# Patient Record
Sex: FEMALE | Race: WHITE | NOT HISPANIC OR LATINO | Employment: UNEMPLOYED | URBAN - METROPOLITAN AREA
[De-identification: names, ages, dates, MRNs, and addresses within clinical notes are randomized per-mention and may not be internally consistent; named-entity substitution may affect disease eponyms.]

---

## 2023-06-01 PROBLEM — K62.5 RECTAL BLEEDING: Status: ACTIVE | Noted: 2023-06-01

## 2023-06-01 PROBLEM — D22.9 CHANGE IN COLOR OF SKIN MOLE: Status: ACTIVE | Noted: 2023-06-01

## 2023-06-01 PROBLEM — L30.9 ECZEMA: Status: ACTIVE | Noted: 2023-06-01

## 2023-06-01 PROBLEM — K64.8 BLEEDING INTERNAL HEMORRHOIDS: Status: ACTIVE | Noted: 2023-06-01

## 2023-06-01 PROBLEM — K64.4 EXTERNAL HEMORRHOIDS: Status: ACTIVE | Noted: 2023-06-01

## 2023-06-01 PROBLEM — M79.675 PAIN OF TOE OF LEFT FOOT: Status: ACTIVE | Noted: 2023-06-01

## 2023-06-01 PROBLEM — K63.5 COLON POLYP: Status: ACTIVE | Noted: 2023-06-01

## 2023-06-01 PROBLEM — J30.2 SEASONAL ALLERGIES: Status: ACTIVE | Noted: 2023-06-01

## 2023-06-01 PROBLEM — L25.9 CONTACT DERMATITIS AND ECZEMA: Status: ACTIVE | Noted: 2023-06-01

## 2023-06-01 PROBLEM — R21 SKIN RASH: Status: ACTIVE | Noted: 2023-06-01

## 2023-06-01 PROBLEM — R23.3 EASY BRUISING: Status: ACTIVE | Noted: 2023-06-01

## 2023-06-01 PROBLEM — K58.0 IRRITABLE BOWEL SYNDROME WITH DIARRHEA: Status: ACTIVE | Noted: 2023-06-01

## 2023-06-01 RX ORDER — GARLIC 200 MG
TABLET ORAL
COMMUNITY
End: 2023-06-08 | Stop reason: ALTCHOICE

## 2023-06-01 RX ORDER — SODIUM, POTASSIUM,MAG SULFATES 17.5-3.13G
SOLUTION, RECONSTITUTED, ORAL ORAL
COMMUNITY
Start: 2021-03-26 | End: 2023-06-08 | Stop reason: ALTCHOICE

## 2023-06-01 RX ORDER — ASPIRIN 325 MG
TABLET, DELAYED RELEASE (ENTERIC COATED) ORAL
COMMUNITY

## 2023-06-01 RX ORDER — HYDROCORTISONE ACETATE 25 MG/1
SUPPOSITORY RECTAL 2 TIMES DAILY
COMMUNITY
Start: 2021-03-23 | End: 2023-06-08 | Stop reason: ALTCHOICE

## 2023-06-01 RX ORDER — HYDROCORTISONE 25 MG/G
CREAM TOPICAL
COMMUNITY
Start: 2020-07-23 | End: 2023-06-08 | Stop reason: ALTCHOICE

## 2023-06-01 RX ORDER — POLYETHYLENE GLYCOL 3350, SODIUM CHLORIDE, SODIUM BICARBONATE, POTASSIUM CHLORIDE 420; 11.2; 5.72; 1.48 G/4L; G/4L; G/4L; G/4L
POWDER, FOR SOLUTION ORAL
COMMUNITY
Start: 2022-09-26 | End: 2023-06-08 | Stop reason: ALTCHOICE

## 2023-06-01 RX ORDER — MOMETASONE FUROATE 1 MG/G
OINTMENT TOPICAL 2 TIMES DAILY
COMMUNITY
Start: 2021-01-28 | End: 2023-06-08 | Stop reason: ALTCHOICE

## 2023-06-08 ENCOUNTER — OFFICE VISIT (OUTPATIENT)
Dept: PRIMARY CARE | Facility: CLINIC | Age: 28
End: 2023-06-08
Payer: COMMERCIAL

## 2023-06-08 ENCOUNTER — APPOINTMENT (OUTPATIENT)
Dept: PRIMARY CARE | Facility: CLINIC | Age: 28
End: 2023-06-08
Payer: COMMERCIAL

## 2023-06-08 VITALS
BODY MASS INDEX: 25.16 KG/M2 | DIASTOLIC BLOOD PRESSURE: 70 MMHG | WEIGHT: 166 LBS | SYSTOLIC BLOOD PRESSURE: 110 MMHG | HEIGHT: 68 IN | HEART RATE: 78 BPM | RESPIRATION RATE: 20 BRPM

## 2023-06-08 DIAGNOSIS — R23.3 EASY BRUISING: ICD-10-CM

## 2023-06-08 DIAGNOSIS — D12.5 ADENOMATOUS POLYP OF SIGMOID COLON: ICD-10-CM

## 2023-06-08 DIAGNOSIS — Z13.29 SCREENING FOR THYROID DISORDER: ICD-10-CM

## 2023-06-08 DIAGNOSIS — R42 EPISODIC LIGHTHEADEDNESS: ICD-10-CM

## 2023-06-08 DIAGNOSIS — Z00.00 ROUTINE GENERAL MEDICAL EXAMINATION AT A HEALTH CARE FACILITY: Primary | ICD-10-CM

## 2023-06-08 DIAGNOSIS — L20.84 INTRINSIC ECZEMA: ICD-10-CM

## 2023-06-08 DIAGNOSIS — K64.4 EXTERNAL HEMORRHOIDS: ICD-10-CM

## 2023-06-08 PROBLEM — K62.5 RECTAL BLEEDING: Status: RESOLVED | Noted: 2023-06-01 | Resolved: 2023-06-08

## 2023-06-08 PROBLEM — M79.675 PAIN OF TOE OF LEFT FOOT: Status: RESOLVED | Noted: 2023-06-01 | Resolved: 2023-06-08

## 2023-06-08 PROCEDURE — 99395 PREV VISIT EST AGE 18-39: CPT | Performed by: INTERNAL MEDICINE

## 2023-06-08 PROCEDURE — 84443 ASSAY THYROID STIM HORMONE: CPT

## 2023-06-08 PROCEDURE — 85027 COMPLETE CBC AUTOMATED: CPT

## 2023-06-08 PROCEDURE — 82728 ASSAY OF FERRITIN: CPT

## 2023-06-08 PROCEDURE — 80053 COMPREHEN METABOLIC PANEL: CPT

## 2023-06-08 PROCEDURE — 83550 IRON BINDING TEST: CPT

## 2023-06-08 PROCEDURE — 85610 PROTHROMBIN TIME: CPT

## 2023-06-08 PROCEDURE — 83540 ASSAY OF IRON: CPT

## 2023-06-08 PROCEDURE — 1036F TOBACCO NON-USER: CPT | Performed by: INTERNAL MEDICINE

## 2023-06-08 RX ORDER — FERROUS SULFATE 325(65) MG
65 TABLET, DELAYED RELEASE (ENTERIC COATED) ORAL
COMMUNITY

## 2023-06-08 RX ORDER — ASCORBIC ACID 1000 MG
TABLET, EXTENDED RELEASE ORAL
COMMUNITY

## 2023-06-08 ASSESSMENT — ENCOUNTER SYMPTOMS
BRUISES/BLEEDS EASILY: 0
FLANK PAIN: 0
UNEXPECTED WEIGHT CHANGE: 0
PHOTOPHOBIA: 0
TROUBLE SWALLOWING: 0
POLYDIPSIA: 0
HEMATURIA: 0
DIAPHORESIS: 0
TREMORS: 0
DYSURIA: 0
APPETITE CHANGE: 0
NECK PAIN: 0
FATIGUE: 0
RHINORRHEA: 0
HEADACHES: 1
ARTHRALGIAS: 0
WOUND: 0
SHORTNESS OF BREATH: 0
NECK STIFFNESS: 0
SINUS PAIN: 0
SPEECH DIFFICULTY: 0
CHOKING: 0
DIZZINESS: 0
DIFFICULTY URINATING: 0
LIGHT-HEADEDNESS: 1
DYSPHORIC MOOD: 0
APNEA: 0
ABDOMINAL PAIN: 0
WEAKNESS: 0
FACIAL ASYMMETRY: 0
ACTIVITY CHANGE: 0
STRIDOR: 0
NUMBNESS: 0
CONSTIPATION: 0
EYE ITCHING: 0
DECREASED CONCENTRATION: 0
JOINT SWELLING: 0
FACIAL SWELLING: 0
NERVOUS/ANXIOUS: 0
CHEST TIGHTNESS: 0
CHILLS: 0
HALLUCINATIONS: 0
AGITATION: 0
WHEEZING: 0
CONFUSION: 0
VOMITING: 0
EYE PAIN: 0
ADENOPATHY: 0
SLEEP DISTURBANCE: 1
COUGH: 0
NAUSEA: 0
RECTAL PAIN: 0
POLYPHAGIA: 0
BLOOD IN STOOL: 0
EYE DISCHARGE: 0
BACK PAIN: 0
ANAL BLEEDING: 0
ABDOMINAL DISTENTION: 0
HYPERACTIVE: 0
SORE THROAT: 0
COLOR CHANGE: 0
SEIZURES: 0
VOICE CHANGE: 0
FEVER: 0
SINUS PRESSURE: 0
DIARRHEA: 0
PALPITATIONS: 0
FREQUENCY: 0
MYALGIAS: 0
EYE REDNESS: 0

## 2023-06-08 NOTE — ASSESSMENT & PLAN NOTE
This is likely from low blood pressure and changes that occur when she changes positions   Recommend salt in diet/drinks to maintain BP  Compression socks if able to tolerate  Slow changes in position

## 2023-06-08 NOTE — PATIENT INSTRUCTIONS
We did labs today and will call with results  See GYN annually for exam (midwife)   Your BP may be dropping when you get up/change positions; can take in a bit more salt-via sports drinks or food  Can consider wearing compression socks (knee highs) to help prevent blood pooling to legs  Continue regular exercise  Follow up yearly for physical

## 2023-06-08 NOTE — PROGRESS NOTES
Subjective   Patient ID: Kendal Feliz is a 27 y.o. female who presents for Annual Exam.    HPI  Pt here for full physical.  She last was seen in 2020.  She is working on weight loss-she lifts weights for exercise.      She continues to have colonoscopies frequently.  She was noted to have polyps.  Her polyps grew quickly in size so they want her to have them every 2 years roughly.  Last one was 12/2022.      Pt has 9 month old and feels mood is a bit down.  Her sleep is not great.  She is breastfeeding and doesn't want to be on meds for this at this time.      She has a rash on her hands.  She has had this since 2020.  She saw Derm recently and was given a steroid cream.      Review of Systems   Constitutional:  Negative for activity change, appetite change, chills, diaphoresis, fatigue, fever and unexpected weight change.   HENT:  Negative for congestion, dental problem, drooling, ear discharge, ear pain, facial swelling, hearing loss, mouth sores, nosebleeds, postnasal drip, rhinorrhea, sinus pressure, sinus pain, sneezing, sore throat, tinnitus, trouble swallowing and voice change.    Eyes:  Positive for visual disturbance (wears glasses-no recent exam). Negative for photophobia, pain, discharge, redness and itching.   Respiratory:  Negative for apnea, cough, choking, chest tightness, shortness of breath, wheezing and stridor.    Cardiovascular:  Negative for chest pain, palpitations and leg swelling.   Gastrointestinal:  Negative for abdominal distention, abdominal pain, anal bleeding, blood in stool, constipation, diarrhea, nausea, rectal pain and vomiting.   Endocrine: Negative for cold intolerance, heat intolerance, polydipsia, polyphagia and polyuria.   Genitourinary:  Negative for decreased urine volume, difficulty urinating, dyspareunia, dysuria, enuresis, flank pain, frequency, genital sores, hematuria, menstrual problem, pelvic pain, urgency, vaginal bleeding, vaginal discharge and vaginal  "pain.   Musculoskeletal:  Negative for arthralgias, back pain, gait problem, joint swelling, myalgias, neck pain and neck stiffness.   Skin:  Positive for rash (hands). Negative for color change, pallor and wound.   Allergic/Immunologic: Negative for environmental allergies, food allergies and immunocompromised state.   Neurological:  Positive for light-headedness and headaches. Negative for dizziness, tremors, seizures, syncope, facial asymmetry, speech difficulty, weakness and numbness.   Hematological:  Negative for adenopathy. Does not bruise/bleed easily.   Psychiatric/Behavioral:  Positive for sleep disturbance (due to having infant). Negative for agitation, behavioral problems, confusion, decreased concentration, dysphoric mood, hallucinations, self-injury and suicidal ideas. The patient is not nervous/anxious and is not hyperactive.        Objective   /70   Pulse 78   Resp 20   Ht 1.734 m (5' 8.25\")   Wt 75.3 kg (166 lb)   BMI 25.06 kg/m²    Physical Exam  Constitutional:       Appearance: Normal appearance.   HENT:      Head: Normocephalic and atraumatic.      Right Ear: Tympanic membrane, ear canal and external ear normal. There is no impacted cerumen.      Left Ear: Tympanic membrane, ear canal and external ear normal. There is no impacted cerumen.      Nose: Nose normal. No congestion or rhinorrhea.      Mouth/Throat:      Mouth: Mucous membranes are moist.      Pharynx: Oropharynx is clear. No oropharyngeal exudate or posterior oropharyngeal erythema.   Eyes:      Extraocular Movements: Extraocular movements intact.      Conjunctiva/sclera: Conjunctivae normal.      Pupils: Pupils are equal, round, and reactive to light.   Neck:      Vascular: No carotid bruit.   Cardiovascular:      Rate and Rhythm: Normal rate and regular rhythm.      Pulses: Normal pulses.      Heart sounds: Normal heart sounds. No murmur heard.  Pulmonary:      Effort: Pulmonary effort is normal. No respiratory distress. "      Breath sounds: Normal breath sounds. No wheezing, rhonchi or rales.   Abdominal:      General: Abdomen is flat. Bowel sounds are normal. There is no distension.      Palpations: Abdomen is soft.      Tenderness: There is no abdominal tenderness.      Hernia: No hernia is present.   Musculoskeletal:         General: No swelling or tenderness. Normal range of motion.      Cervical back: Normal range of motion and neck supple.      Right lower leg: No edema.      Left lower leg: No edema.   Lymphadenopathy:      Cervical: No cervical adenopathy.   Skin:     General: Skin is warm and dry.      Findings: No lesion or rash.   Neurological:      General: No focal deficit present.      Mental Status: She is alert and oriented to person, place, and time.      Cranial Nerves: No cranial nerve deficit.      Sensory: No sensory deficit.      Motor: No weakness.   Psychiatric:         Mood and Affect: Mood normal.         Behavior: Behavior normal.         Thought Content: Thought content normal.         Judgment: Judgment normal.         Assessment/Plan   Problem List Items Addressed This Visit          Circulatory    External hemorrhoids       Digestive    Colon polyp     She is having colonoscopies every 2 years due to size and growth of her polyps             Infectious/Inflammatory    Eczema     Had a new steroid cream from derm             Other    Easy bruising    Relevant Orders    Iron and TIBC    Ferritin    Protime-INR    Episodic lightheadedness     This is likely from low blood pressure and changes that occur when she changes positions   Recommend salt in diet/drinks to maintain BP  Compression socks if able to tolerate  Slow changes in position           Other Visit Diagnoses       Routine general medical examination at a health care facility    -  Primary    Relevant Orders    Comprehensive Metabolic Panel    CBC    Follow Up In Primary Care    Screening for thyroid disorder        Relevant Orders    TSH

## 2023-06-09 LAB
ALANINE AMINOTRANSFERASE (SGPT) (U/L) IN SER/PLAS: 13 U/L (ref 7–45)
ALBUMIN (G/DL) IN SER/PLAS: 4.9 G/DL (ref 3.4–5)
ALKALINE PHOSPHATASE (U/L) IN SER/PLAS: 100 U/L (ref 33–110)
ANION GAP IN SER/PLAS: 16 MMOL/L (ref 10–20)
ASPARTATE AMINOTRANSFERASE (SGOT) (U/L) IN SER/PLAS: 21 U/L (ref 9–39)
BILIRUBIN TOTAL (MG/DL) IN SER/PLAS: 0.4 MG/DL (ref 0–1.2)
CALCIUM (MG/DL) IN SER/PLAS: 10 MG/DL (ref 8.6–10.6)
CARBON DIOXIDE, TOTAL (MMOL/L) IN SER/PLAS: 25 MMOL/L (ref 21–32)
CHLORIDE (MMOL/L) IN SER/PLAS: 104 MMOL/L (ref 98–107)
CREATININE (MG/DL) IN SER/PLAS: 0.81 MG/DL (ref 0.5–1.05)
ERYTHROCYTE DISTRIBUTION WIDTH (RATIO) BY AUTOMATED COUNT: 12.1 % (ref 11.5–14.5)
ERYTHROCYTE MEAN CORPUSCULAR HEMOGLOBIN CONCENTRATION (G/DL) BY AUTOMATED: 32.3 G/DL (ref 32–36)
ERYTHROCYTE MEAN CORPUSCULAR VOLUME (FL) BY AUTOMATED COUNT: 98 FL (ref 80–100)
ERYTHROCYTES (10*6/UL) IN BLOOD BY AUTOMATED COUNT: 4.51 X10E12/L (ref 4–5.2)
FERRITIN (UG/LL) IN SER/PLAS: 64 UG/L (ref 8–150)
GFR FEMALE: >90 ML/MIN/1.73M2
GLUCOSE (MG/DL) IN SER/PLAS: 89 MG/DL (ref 74–99)
HEMATOCRIT (%) IN BLOOD BY AUTOMATED COUNT: 44.3 % (ref 36–46)
HEMOGLOBIN (G/DL) IN BLOOD: 14.3 G/DL (ref 12–16)
INR IN PPP BY COAGULATION ASSAY: 0.9 (ref 0.9–1.1)
IRON (UG/DL) IN SER/PLAS: 142 UG/DL (ref 35–150)
IRON BINDING CAPACITY (UG/DL) IN SER/PLAS: 332 UG/DL (ref 240–445)
IRON SATURATION (%) IN SER/PLAS: 43 % (ref 25–45)
LEUKOCYTES (10*3/UL) IN BLOOD BY AUTOMATED COUNT: 5.5 X10E9/L (ref 4.4–11.3)
NRBC (PER 100 WBCS) BY AUTOMATED COUNT: 0 /100 WBC (ref 0–0)
PLATELETS (10*3/UL) IN BLOOD AUTOMATED COUNT: 229 X10E9/L (ref 150–450)
POTASSIUM (MMOL/L) IN SER/PLAS: 4 MMOL/L (ref 3.5–5.3)
PROTEIN TOTAL: 7.3 G/DL (ref 6.4–8.2)
PROTHROMBIN TIME (PT) IN PPP BY COAGULATION ASSAY: 10.6 SEC (ref 9.8–13.4)
SODIUM (MMOL/L) IN SER/PLAS: 141 MMOL/L (ref 136–145)
THYROTROPIN (MIU/L) IN SER/PLAS BY DETECTION LIMIT <= 0.05 MIU/L: 1.66 MIU/L (ref 0.44–3.98)
UREA NITROGEN (MG/DL) IN SER/PLAS: 19 MG/DL (ref 6–23)

## 2024-06-18 ENCOUNTER — APPOINTMENT (OUTPATIENT)
Dept: PRIMARY CARE | Facility: CLINIC | Age: 29
End: 2024-06-18
Payer: COMMERCIAL

## 2024-06-18 VITALS
WEIGHT: 162.2 LBS | HEIGHT: 69 IN | SYSTOLIC BLOOD PRESSURE: 118 MMHG | DIASTOLIC BLOOD PRESSURE: 80 MMHG | BODY MASS INDEX: 24.02 KG/M2 | HEART RATE: 80 BPM | RESPIRATION RATE: 16 BRPM

## 2024-06-18 DIAGNOSIS — Z13.29 SCREENING FOR THYROID DISORDER: ICD-10-CM

## 2024-06-18 DIAGNOSIS — J30.2 SEASONAL ALLERGIES: ICD-10-CM

## 2024-06-18 DIAGNOSIS — R23.3 EASY BRUISING: ICD-10-CM

## 2024-06-18 DIAGNOSIS — Z00.00 ROUTINE GENERAL MEDICAL EXAMINATION AT A HEALTH CARE FACILITY: Primary | ICD-10-CM

## 2024-06-18 PROCEDURE — 85027 COMPLETE CBC AUTOMATED: CPT

## 2024-06-18 PROCEDURE — 1036F TOBACCO NON-USER: CPT | Performed by: INTERNAL MEDICINE

## 2024-06-18 PROCEDURE — 84443 ASSAY THYROID STIM HORMONE: CPT

## 2024-06-18 PROCEDURE — 80053 COMPREHEN METABOLIC PANEL: CPT

## 2024-06-18 PROCEDURE — 99395 PREV VISIT EST AGE 18-39: CPT | Performed by: INTERNAL MEDICINE

## 2024-06-18 PROCEDURE — 36415 COLL VENOUS BLD VENIPUNCTURE: CPT

## 2024-06-18 ASSESSMENT — ENCOUNTER SYMPTOMS
COUGH: 0
HEMATURIA: 0
APPETITE CHANGE: 0
VOICE CHANGE: 0
DIZZINESS: 0
RECTAL PAIN: 0
NUMBNESS: 0
VOMITING: 0
POLYDIPSIA: 0
TROUBLE SWALLOWING: 0
JOINT SWELLING: 0
MYALGIAS: 0
APNEA: 0
EYE PAIN: 0
EYE ITCHING: 0
STRIDOR: 0
EYE DISCHARGE: 0
HYPERACTIVE: 0
DIAPHORESIS: 0
FLANK PAIN: 0
LIGHT-HEADEDNESS: 0
BLOOD IN STOOL: 0
FATIGUE: 0
CONSTIPATION: 0
FREQUENCY: 0
ACTIVITY CHANGE: 0
CHILLS: 0
DIARRHEA: 0
COLOR CHANGE: 0
NECK STIFFNESS: 0
FACIAL ASYMMETRY: 0
WOUND: 0
EYE REDNESS: 0
WHEEZING: 0
ANAL BLEEDING: 0
NAUSEA: 0
ABDOMINAL DISTENTION: 0
ARTHRALGIAS: 0
ADENOPATHY: 0
PHOTOPHOBIA: 0
AGITATION: 0
SEIZURES: 0
HEADACHES: 0
FACIAL SWELLING: 0
SORE THROAT: 0
POLYPHAGIA: 0
DECREASED CONCENTRATION: 0
BACK PAIN: 0
ABDOMINAL PAIN: 0
SLEEP DISTURBANCE: 0
UNEXPECTED WEIGHT CHANGE: 0
SHORTNESS OF BREATH: 0
BRUISES/BLEEDS EASILY: 1
NERVOUS/ANXIOUS: 0
SPEECH DIFFICULTY: 0
SINUS PAIN: 0
CHEST TIGHTNESS: 0
NECK PAIN: 0
DYSPHORIC MOOD: 0
PALPITATIONS: 0
RHINORRHEA: 0
FEVER: 0
CONFUSION: 0
TREMORS: 0
DIFFICULTY URINATING: 0
WEAKNESS: 0
HALLUCINATIONS: 0
CHOKING: 0
SINUS PRESSURE: 0
DYSURIA: 0

## 2024-06-18 ASSESSMENT — PATIENT HEALTH QUESTIONNAIRE - PHQ9
1. LITTLE INTEREST OR PLEASURE IN DOING THINGS: NOT AT ALL
2. FEELING DOWN, DEPRESSED OR HOPELESS: NOT AT ALL
SUM OF ALL RESPONSES TO PHQ9 QUESTIONS 1 AND 2: 0

## 2024-06-18 NOTE — ASSESSMENT & PLAN NOTE
Labs noted to be normal in past  Will repeat platelet counts today  No spontaneous bleeding noted

## 2024-06-18 NOTE — PATIENT INSTRUCTIONS
We did blood work today and will call with results  Follow through with uterine surgery due to septum as directed   Continue to see GYN annually for exam  Can take antihistamines to help with itching hands and use small amounts of hydrocortisone onto skin as needed  Debrox ear drops for wax removal   Continue healthy diet and exercise regularly  Follow up in 1 year

## 2024-06-18 NOTE — PROGRESS NOTES
Subjective   Patient ID: Kendla Feliz is a 28 y.o. female who presents for Annual Exam.    HPI  Pt here for full physical.  She is exercising 3 days a week and diet is good.      She sees GYN-through [facility] () .  She had a visit in the last year.  She had to see a specialist due to intrauterine septum.  She will have this done next month.     She has itching of her hands at times and faint rough rash.  She has been told contact dermatitis in the past.      Review of Systems   Constitutional:  Negative for activity change, appetite change, chills, diaphoresis, fatigue, fever and unexpected weight change.   HENT:  Negative for congestion, dental problem, drooling, ear discharge, ear pain, facial swelling, hearing loss, mouth sores, nosebleeds, postnasal drip, rhinorrhea, sinus pressure, sinus pain, sneezing, sore throat, tinnitus, trouble swallowing and voice change.    Eyes:  Negative for photophobia, pain, discharge, redness, itching and visual disturbance.   Respiratory:  Negative for apnea, cough, choking, chest tightness, shortness of breath, wheezing and stridor.    Cardiovascular:  Negative for chest pain, palpitations and leg swelling.   Gastrointestinal:  Negative for abdominal distention, abdominal pain, anal bleeding, blood in stool, constipation, diarrhea, nausea, rectal pain and vomiting.   Endocrine: Negative for cold intolerance, heat intolerance, polydipsia, polyphagia and polyuria.   Genitourinary:  Negative for decreased urine volume, difficulty urinating, dyspareunia, dysuria, enuresis, flank pain, frequency, genital sores, hematuria, menstrual problem, pelvic pain, urgency, vaginal bleeding, vaginal discharge and vaginal pain.   Musculoskeletal:  Negative for arthralgias, back pain, gait problem, joint swelling, myalgias, neck pain and neck stiffness.   Skin:  Negative for color change, pallor, rash and wound.        +itching of hands    Allergic/Immunologic: Positive  "for environmental allergies. Negative for food allergies and immunocompromised state.   Neurological:  Negative for dizziness, tremors, seizures, syncope, facial asymmetry, speech difficulty, weakness, light-headedness, numbness and headaches.   Hematological:  Negative for adenopathy. Bruises/bleeds easily.   Psychiatric/Behavioral:  Negative for agitation, behavioral problems, confusion, decreased concentration, dysphoric mood, hallucinations, self-injury, sleep disturbance and suicidal ideas. The patient is not nervous/anxious and is not hyperactive.        Objective   /80 (BP Location: Right arm, Patient Position: Sitting)   Pulse 80   Resp 16   Ht 1.74 m (5' 8.5\")   Wt 73.6 kg (162 lb 3.2 oz)   BMI 24.30 kg/m²    Physical Exam  Constitutional:       Appearance: Normal appearance.   HENT:      Head: Normocephalic and atraumatic.      Right Ear: Tympanic membrane, ear canal and external ear normal. There is no impacted cerumen.      Left Ear: Tympanic membrane, ear canal and external ear normal. There is no impacted cerumen.      Nose: Nose normal. No congestion or rhinorrhea.      Mouth/Throat:      Mouth: Mucous membranes are moist.      Pharynx: Oropharynx is clear. No oropharyngeal exudate or posterior oropharyngeal erythema.   Eyes:      Extraocular Movements: Extraocular movements intact.      Conjunctiva/sclera: Conjunctivae normal.      Pupils: Pupils are equal, round, and reactive to light.   Neck:      Vascular: No carotid bruit.   Cardiovascular:      Rate and Rhythm: Normal rate and regular rhythm.      Pulses: Normal pulses.      Heart sounds: Normal heart sounds. No murmur heard.  Pulmonary:      Effort: Pulmonary effort is normal. No respiratory distress.      Breath sounds: Normal breath sounds. No wheezing, rhonchi or rales.   Abdominal:      General: Abdomen is flat. Bowel sounds are normal. There is no distension.      Palpations: Abdomen is soft.      Tenderness: There is no " abdominal tenderness.      Hernia: No hernia is present.   Musculoskeletal:         General: No swelling, tenderness, deformity or signs of injury. Normal range of motion.      Cervical back: Normal range of motion and neck supple.      Right lower leg: No edema.      Left lower leg: No edema.   Lymphadenopathy:      Cervical: No cervical adenopathy.   Skin:     General: Skin is warm and dry.      Findings: No bruising, lesion or rash.   Neurological:      General: No focal deficit present.      Mental Status: She is alert and oriented to person, place, and time.      Cranial Nerves: No cranial nerve deficit.      Sensory: No sensory deficit.      Motor: No weakness.   Psychiatric:         Mood and Affect: Mood normal.         Behavior: Behavior normal.         Thought Content: Thought content normal.         Judgment: Judgment normal.         Assessment/Plan   Problem List Items Addressed This Visit       Easy bruising     Labs noted to be normal in past  Will repeat platelet counts today  No spontaneous bleeding noted          Seasonal allergies     Notes it on her skin   Can take antihistamines and topical otc steroid creams to help manage           Other Visit Diagnoses       Routine general medical examination at a health care facility    -  Primary    Relevant Orders    Comprehensive Metabolic Panel    CBC    Follow Up In Primary Care - Health Maintenance    Screening for thyroid disorder        Relevant Orders    TSH with reflex to Free T4 if abnormal

## 2024-06-19 LAB
ALBUMIN SERPL BCP-MCNC: 4.5 G/DL (ref 3.4–5)
ALP SERPL-CCNC: 82 U/L (ref 33–110)
ALT SERPL W P-5'-P-CCNC: 14 U/L (ref 7–45)
ANION GAP SERPL CALC-SCNC: 14 MMOL/L (ref 10–20)
AST SERPL W P-5'-P-CCNC: 17 U/L (ref 9–39)
BILIRUB SERPL-MCNC: 0.3 MG/DL (ref 0–1.2)
BUN SERPL-MCNC: 16 MG/DL (ref 6–23)
CALCIUM SERPL-MCNC: 9.4 MG/DL (ref 8.6–10.6)
CHLORIDE SERPL-SCNC: 102 MMOL/L (ref 98–107)
CO2 SERPL-SCNC: 27 MMOL/L (ref 21–32)
CREAT SERPL-MCNC: 0.72 MG/DL (ref 0.5–1.05)
EGFRCR SERPLBLD CKD-EPI 2021: >90 ML/MIN/1.73M*2
ERYTHROCYTE [DISTWIDTH] IN BLOOD BY AUTOMATED COUNT: 12.3 % (ref 11.5–14.5)
GLUCOSE SERPL-MCNC: 71 MG/DL (ref 74–99)
HCT VFR BLD AUTO: 41.8 % (ref 36–46)
HGB BLD-MCNC: 14 G/DL (ref 12–16)
MCH RBC QN AUTO: 33.3 PG (ref 26–34)
MCHC RBC AUTO-ENTMCNC: 33.5 G/DL (ref 32–36)
MCV RBC AUTO: 99 FL (ref 80–100)
NRBC BLD-RTO: 0 /100 WBCS (ref 0–0)
PLATELET # BLD AUTO: 201 X10*3/UL (ref 150–450)
POTASSIUM SERPL-SCNC: 4.2 MMOL/L (ref 3.5–5.3)
PROT SERPL-MCNC: 7.1 G/DL (ref 6.4–8.2)
RBC # BLD AUTO: 4.21 X10*6/UL (ref 4–5.2)
SODIUM SERPL-SCNC: 139 MMOL/L (ref 136–145)
TSH SERPL-ACNC: 1.27 MIU/L (ref 0.44–3.98)
WBC # BLD AUTO: 7.3 X10*3/UL (ref 4.4–11.3)

## 2024-07-22 DIAGNOSIS — N91.2 AMENORRHEA: Primary | ICD-10-CM

## 2024-07-23 ENCOUNTER — LAB (OUTPATIENT)
Dept: LAB | Facility: LAB | Age: 29
End: 2024-07-23
Payer: COMMERCIAL

## 2024-07-23 DIAGNOSIS — N91.2 AMENORRHEA: ICD-10-CM

## 2024-07-23 LAB — B-HCG SERPL-ACNC: 117 MIU/ML

## 2024-07-23 PROCEDURE — 36415 COLL VENOUS BLD VENIPUNCTURE: CPT

## 2024-07-23 PROCEDURE — 84702 CHORIONIC GONADOTROPIN TEST: CPT

## 2024-11-11 ENCOUNTER — HOSPITAL ENCOUNTER (OUTPATIENT)
Dept: RADIOLOGY | Facility: CLINIC | Age: 29
Discharge: HOME | End: 2024-11-11
Payer: COMMERCIAL

## 2024-11-11 DIAGNOSIS — Z34.90 ENCOUNTER FOR SUPERVISION OF NORMAL PREGNANCY, UNSPECIFIED, UNSPECIFIED TRIMESTER: ICD-10-CM

## 2024-11-11 PROCEDURE — 76805 OB US >/= 14 WKS SNGL FETUS: CPT | Performed by: OBSTETRICS & GYNECOLOGY

## 2024-11-11 PROCEDURE — 76817 TRANSVAGINAL US OBSTETRIC: CPT | Performed by: OBSTETRICS & GYNECOLOGY

## 2024-11-11 PROCEDURE — 76805 OB US >/= 14 WKS SNGL FETUS: CPT

## 2024-11-11 PROCEDURE — 76817 TRANSVAGINAL US OBSTETRIC: CPT

## 2024-11-12 ENCOUNTER — APPOINTMENT (OUTPATIENT)
Dept: RADIOLOGY | Facility: CLINIC | Age: 29
End: 2024-11-12
Payer: COMMERCIAL

## 2025-01-21 ENCOUNTER — HOSPITAL ENCOUNTER (OUTPATIENT)
Dept: RADIOLOGY | Facility: CLINIC | Age: 30
Discharge: HOME | End: 2025-01-21
Payer: COMMERCIAL

## 2025-01-21 DIAGNOSIS — Z34.90 ENCOUNTER FOR SUPERVISION OF NORMAL PREGNANCY, UNSPECIFIED, UNSPECIFIED TRIMESTER: ICD-10-CM

## 2025-01-21 PROCEDURE — 76816 OB US FOLLOW-UP PER FETUS: CPT | Performed by: OBSTETRICS & GYNECOLOGY

## 2025-01-21 PROCEDURE — 76816 OB US FOLLOW-UP PER FETUS: CPT

## 2025-03-04 ENCOUNTER — APPOINTMENT (OUTPATIENT)
Dept: RADIOLOGY | Facility: CLINIC | Age: 30
End: 2025-03-04
Payer: COMMERCIAL

## 2025-04-11 ENCOUNTER — APPOINTMENT (OUTPATIENT)
Dept: RADIOLOGY | Facility: HOSPITAL | Age: 30
End: 2025-04-11
Payer: COMMERCIAL

## 2025-06-13 ENCOUNTER — TELEPHONE (OUTPATIENT)
Dept: PRIMARY CARE | Facility: CLINIC | Age: 30
End: 2025-06-13
Payer: COMMERCIAL

## 2025-06-13 DIAGNOSIS — Z87.442 HISTORY OF KIDNEY STONES: Primary | ICD-10-CM

## 2025-06-13 RX ORDER — TAMSULOSIN HYDROCHLORIDE 0.4 MG/1
0.4 CAPSULE ORAL DAILY
Qty: 30 CAPSULE | Refills: 0 | Status: SHIPPED | OUTPATIENT
Start: 2025-06-13 | End: 2026-06-13

## 2025-06-13 RX ORDER — TAMSULOSIN HYDROCHLORIDE 0.4 MG/1
0.4 CAPSULE ORAL DAILY
Qty: 30 CAPSULE | Refills: 11 | Status: SHIPPED | OUTPATIENT
Start: 2025-06-13 | End: 2025-06-13

## 2025-06-19 ENCOUNTER — APPOINTMENT (OUTPATIENT)
Dept: PRIMARY CARE | Facility: CLINIC | Age: 30
End: 2025-06-19
Payer: COMMERCIAL

## 2025-06-19 VITALS
DIASTOLIC BLOOD PRESSURE: 81 MMHG | RESPIRATION RATE: 15 BRPM | BODY MASS INDEX: 29.12 KG/M2 | HEIGHT: 68 IN | TEMPERATURE: 97.2 F | OXYGEN SATURATION: 98 % | SYSTOLIC BLOOD PRESSURE: 122 MMHG | HEART RATE: 80 BPM | WEIGHT: 192.1 LBS

## 2025-06-19 DIAGNOSIS — J30.2 SEASONAL ALLERGIES: ICD-10-CM

## 2025-06-19 DIAGNOSIS — L30.9 ECZEMA, UNSPECIFIED TYPE: ICD-10-CM

## 2025-06-19 DIAGNOSIS — Z00.00 ROUTINE GENERAL MEDICAL EXAMINATION AT A HEALTH CARE FACILITY: Primary | ICD-10-CM

## 2025-06-19 PROBLEM — K58.0 IRRITABLE BOWEL SYNDROME WITH DIARRHEA: Status: RESOLVED | Noted: 2023-06-01 | Resolved: 2025-06-19

## 2025-06-19 PROBLEM — R23.3 EASY BRUISING: Status: RESOLVED | Noted: 2023-06-01 | Resolved: 2025-06-19

## 2025-06-19 PROBLEM — R21 SKIN RASH: Status: RESOLVED | Noted: 2023-06-01 | Resolved: 2025-06-19

## 2025-06-19 PROCEDURE — 99395 PREV VISIT EST AGE 18-39: CPT | Performed by: INTERNAL MEDICINE

## 2025-06-19 PROCEDURE — 3008F BODY MASS INDEX DOCD: CPT | Performed by: INTERNAL MEDICINE

## 2025-06-19 PROCEDURE — 1036F TOBACCO NON-USER: CPT | Performed by: INTERNAL MEDICINE

## 2025-06-19 RX ORDER — MV-MIN NO.113/IRON/FOLIC ACID 4.5 MG-2
CAPSULE ORAL
COMMUNITY
Start: 2025-01-03

## 2025-06-19 ASSESSMENT — ENCOUNTER SYMPTOMS
HALLUCINATIONS: 0
HEMATURIA: 0
COLOR CHANGE: 0
DYSPHORIC MOOD: 0
DIFFICULTY URINATING: 0
PHOTOPHOBIA: 0
NUMBNESS: 0
JOINT SWELLING: 0
NECK STIFFNESS: 0
VOICE CHANGE: 0
CONSTIPATION: 0
ACTIVITY CHANGE: 0
SINUS PAIN: 0
APPETITE CHANGE: 0
DIAPHORESIS: 0
BACK PAIN: 0
BLOOD IN STOOL: 0
LIGHT-HEADEDNESS: 0
BRUISES/BLEEDS EASILY: 0
EYE ITCHING: 0
FATIGUE: 0
CHEST TIGHTNESS: 0
CHILLS: 0
CHOKING: 0
POLYDIPSIA: 0
TREMORS: 0
NAUSEA: 0
WEAKNESS: 0
SINUS PRESSURE: 0
ADENOPATHY: 0
ABDOMINAL PAIN: 0
UNEXPECTED WEIGHT CHANGE: 0
EYE REDNESS: 0
RHINORRHEA: 0
RECTAL PAIN: 0
MYALGIAS: 0
FEVER: 0
DIARRHEA: 0
FLANK PAIN: 0
SEIZURES: 0
WOUND: 0
APNEA: 0
DECREASED CONCENTRATION: 0
SORE THROAT: 0
SPEECH DIFFICULTY: 0
NECK PAIN: 0
COUGH: 0
HYPERACTIVE: 0
FACIAL SWELLING: 0
FREQUENCY: 0
DIZZINESS: 0
TROUBLE SWALLOWING: 0
ANAL BLEEDING: 0
VOMITING: 0
SHORTNESS OF BREATH: 0
EYE PAIN: 0
FACIAL ASYMMETRY: 0
HEADACHES: 0
CONFUSION: 0
ARTHRALGIAS: 0
SLEEP DISTURBANCE: 0
EYE DISCHARGE: 0
ABDOMINAL DISTENTION: 0
STRIDOR: 0
DYSURIA: 0
POLYPHAGIA: 0
NERVOUS/ANXIOUS: 0
WHEEZING: 0
AGITATION: 0
PALPITATIONS: 0

## 2025-06-19 ASSESSMENT — PATIENT HEALTH QUESTIONNAIRE - PHQ9
2. FEELING DOWN, DEPRESSED OR HOPELESS: NOT AT ALL
1. LITTLE INTEREST OR PLEASURE IN DOING THINGS: NOT AT ALL
SUM OF ALL RESPONSES TO PHQ9 QUESTIONS 1 AND 2: 0

## 2025-06-19 NOTE — PROGRESS NOTES
Subjective   Patient ID: Kendal Feliz is a 29 y.o. female who presents for Annual Exam.    HPI  Pt here for physical.  She just had a baby in early April.  She had a little girl-she has 2 boys at home.      She followed up with GYN after her 6 week.  She has follow up again 3 months for her pap.  No vaginal issues-no issues with vaginal birth.      Recently called concerned about kidney stones.  Asking for Flomax which rx was sent in-she did take 2 doses.  She tells me she had some blood noted when wiping and back pain-wasn't sure if kidney stones or period.  It did resolve.          Review of Systems   Constitutional:  Negative for activity change, appetite change, chills, diaphoresis, fatigue, fever and unexpected weight change.   HENT:  Negative for congestion, dental problem, drooling, ear discharge, ear pain, facial swelling, hearing loss, mouth sores, nosebleeds, postnasal drip, rhinorrhea, sinus pressure, sinus pain, sneezing, sore throat, tinnitus, trouble swallowing and voice change.    Eyes:  Negative for photophobia, pain, discharge, redness, itching and visual disturbance (wears glasses-up to date on exam).   Respiratory:  Negative for apnea, cough, choking, chest tightness, shortness of breath, wheezing and stridor.    Cardiovascular:  Negative for chest pain, palpitations and leg swelling.   Gastrointestinal:  Negative for abdominal distention, abdominal pain, anal bleeding, blood in stool, constipation, diarrhea, nausea, rectal pain and vomiting.   Endocrine: Negative for cold intolerance, heat intolerance, polydipsia, polyphagia and polyuria.   Genitourinary:  Negative for decreased urine volume, difficulty urinating, dyspareunia, dysuria, enuresis, flank pain, frequency, genital sores, hematuria, menstrual problem, pelvic pain, urgency, vaginal bleeding, vaginal discharge and vaginal pain.   Musculoskeletal:  Negative for arthralgias, back pain, gait problem, joint swelling, myalgias,  "neck pain and neck stiffness.   Skin:  Negative for color change, pallor, rash and wound.   Allergic/Immunologic: Negative for environmental allergies, food allergies and immunocompromised state.   Neurological:  Negative for dizziness, tremors, seizures, syncope, facial asymmetry, speech difficulty, weakness, light-headedness, numbness and headaches.   Hematological:  Negative for adenopathy. Does not bruise/bleed easily.   Psychiatric/Behavioral:  Negative for agitation, behavioral problems, confusion, decreased concentration, dysphoric mood, hallucinations, self-injury, sleep disturbance and suicidal ideas. The patient is not nervous/anxious and is not hyperactive.        Objective   /81 (BP Location: Left arm, Patient Position: Sitting)   Pulse 80   Temp 36.2 °C (97.2 °F) (Tympanic)   Resp 15   Ht 1.727 m (5' 8\")   Wt 87.1 kg (192 lb 1.6 oz)   SpO2 98%   Breastfeeding Yes   BMI 29.21 kg/m²    Physical Exam  Constitutional:       Appearance: Normal appearance.   HENT:      Head: Normocephalic and atraumatic.      Right Ear: Tympanic membrane, ear canal and external ear normal. There is no impacted cerumen.      Left Ear: Tympanic membrane, ear canal and external ear normal. There is no impacted cerumen.      Nose: Nose normal. No congestion or rhinorrhea.      Mouth/Throat:      Mouth: Mucous membranes are moist.      Pharynx: Oropharynx is clear. No oropharyngeal exudate or posterior oropharyngeal erythema.   Eyes:      Extraocular Movements: Extraocular movements intact.      Conjunctiva/sclera: Conjunctivae normal.      Pupils: Pupils are equal, round, and reactive to light.   Neck:      Vascular: No carotid bruit.   Cardiovascular:      Rate and Rhythm: Normal rate and regular rhythm.      Pulses: Normal pulses.      Heart sounds: Normal heart sounds. No murmur heard.  Pulmonary:      Effort: Pulmonary effort is normal. No respiratory distress.      Breath sounds: Normal breath sounds. No " wheezing, rhonchi or rales.   Abdominal:      General: Abdomen is flat. Bowel sounds are normal. There is no distension.      Palpations: Abdomen is soft.      Tenderness: There is no abdominal tenderness.      Hernia: No hernia is present.   Musculoskeletal:         General: No swelling or tenderness. Normal range of motion.      Cervical back: Normal range of motion and neck supple.      Right lower leg: No edema.      Left lower leg: No edema.   Lymphadenopathy:      Cervical: No cervical adenopathy.   Skin:     General: Skin is warm and dry.      Findings: No lesion or rash.   Neurological:      General: No focal deficit present.      Mental Status: She is alert and oriented to person, place, and time.      Cranial Nerves: No cranial nerve deficit.      Sensory: No sensory deficit.      Motor: No weakness.   Psychiatric:         Mood and Affect: Mood normal.         Behavior: Behavior normal.         Thought Content: Thought content normal.         Judgment: Judgment normal.         Assessment/Plan   Problem List Items Addressed This Visit       Eczema    Luke warm showers, plain soaps/detergents, thick moisturizer to full body          Seasonal allergies    Meds as needed          Routine general medical examination at a health care facility - Primary    Relevant Orders    Follow Up In Primary Care - Health Maintenance

## 2025-06-19 NOTE — PATIENT INSTRUCTIONS
Continue to devote time to sleep when able  Eat healthy and well balanced; try to get outdoors and walk when able   See GYN when scheduled for routine exam  Consider flu/covid boosters come fall if interested  Follow up here in 1 year

## 2026-06-22 ENCOUNTER — APPOINTMENT (OUTPATIENT)
Dept: PRIMARY CARE | Facility: CLINIC | Age: 31
End: 2026-06-22
Payer: COMMERCIAL